# Patient Record
(demographics unavailable — no encounter records)

---

## 2024-10-25 NOTE — SOCIAL HISTORY
[Parent(s)] : parent(s) [___ Brothers] : [unfilled] brothers [Grade:  _____] : Grade: [unfilled] [Cat] : cat [Smokers in Household] : there are no smokers in the home

## 2024-10-25 NOTE — BIRTH HISTORY
[At ___ Weeks Gestation] : at [unfilled] weeks gestation [de-identified] : twin A [FreeTextEntry1] : 6-1 [FreeTextEntry4] : pregnancy complicated by hypertension, incompetent cervix, cholestasis. Cerclage placed.  Mother was on bed rest from 4 months of age.

## 2024-10-25 NOTE — PHYSICAL EXAM
[Well Nourished] : well nourished [Well Developed] : well developed [Alert] : ~L alert [Active] : active [No Allergic Shiners] : no allergic shiners [No Drainage] : no drainage [No Conjunctivitis] : no conjunctivitis [Tympanic Membranes Clear] : tympanic membranes were clear [No Nasal Drainage] : no nasal drainage [No Sinus Tenderness] : no sinus tenderness [No Oral Pallor] : no oral pallor [No Oral Cyanosis] : no oral cyanosis [Non-Erythematous] : non-erythematous [Tonsil Size ___] : tonsil size [unfilled] [No Tonsillar Enlargement] : no tonsillar enlargement [No Stridor] : no stridor [Absence Of Retractions] : absence of retractions [Symmetric] : symmetric [Good Expansion] : good expansion [No Acc Muscle Use] : no accessory muscle use [Normal Sinus Rhythm] : normal sinus rhythm [No Heart Murmur] : no heart murmur [Soft, Non-Tender] : soft, non-tender [No Hepatosplenomegaly] : no hepatosplenomegaly [Non Distended] : was not ~L distended [Abdomen Mass (___ Cm)] : no abdominal mass palpated [Abdomen Hernia] : no hernia was discovered [Full ROM] : full range of motion [No Clubbing] : no clubbing [No Cyanosis] : no cyanosis [No Petechiae] : no petechiae [No Kyphoscoliosis] : no kyphoscoliosis [No Contractures] : no contractures [Abnormal Walk] : normal gait [Alert and  Oriented] : alert and oriented [No Abnormal Focal Findings] : no abnormal focal findings [Normal Muscle Tone And Reflexes] : normal muscle tone and reflexes [No Birth Marks] : no birth marks [No Skin Ulcers] : no skin ulcers [Good aeration to bases] : good aeration to bases [Equal Breath Sounds] : equal breath sounds bilaterally [No Crackles] : no crackles [No Rhonchi] : no rhonchi [No Wheezing] : no wheezing [FreeTextEntry4] : Nasal mucous membranes lyly [de-identified] : Papular rash arms, improved

## 2024-10-25 NOTE — CONSULT LETTER
[Dear  ___] : Dear  [unfilled], [Consult Letter:] : I had the pleasure of evaluating your patient, [unfilled]. [Please see my note below.] : Please see my note below. [Consult Closing:] : Thank you very much for allowing me to participate in the care of this patient.  If you have any questions, please do not hesitate to contact me. [Sincerely,] : Sincerely, [FreeTextEntry3] : Cayla Cedeno MD\par  Pediatric Pulmonology and Sleep Medicine\par  Director Pediatric Ast[facility] Center\par  , Pediatric Sleep Disorders,\par   of Pediatrics, [facility],\par  [address] [place] [address]\[place] [address]\par  (P)[phone]\par  (P) [phone]\par  (F) [phone] \par  \par

## 2024-10-25 NOTE — REVIEW OF SYSTEMS
[Nl] : Endocrine [Rash] : rash [Eczema] : eczema [Allergy Shiners] : allergy shiners [Frequent URIs] : no frequent upper respiratory infections [Snoring] : no snoring [Apnea] : no apnea [Restlessness] : no restlessness [Daytime Sleepiness] : no daytime sleepiness [Daytime Hyperactivity] : no daytime hyperactivity [Voice Changes] : no voice changes [Frequent Croup] : no frequent croup [Chronic Hoarseness] : no chronic hoarseness [Rhinorrhea] : no rhinorrhea [Nasal Congestion] : no nasal congestion [Sinus Problems] : no sinus problems [Postnasl Drip] : no postnasal drip [Epistaxis] : no epistaxis [Tinnitus] : no tinnitus [Recurrent Ear Infections] : no recurrent ear infections [Recurrent Sinus Infections] : no recurrent sinus infections [Tachypnea] : not tachypneic [Wheezing] : no wheezing [Cough] : no cough [Shortness of Breath] : no shortness of breath [Bronchitis] : no bronchitis [Pneumonia] : no pneumonia [Hemoptysis] : no hemoptysis [Sputum] : no sputum [Chronically Infected with ___] : no chronic infections [Spitting Up] : not spitting up [Problems Swallowing] : no problems swallowing [Abdominal Pain] : no abdominal pain [Diarrhea] : no diarrhea [Constipation] : no constipation [Foul Smelling Stool] : no foul smelling stool [Oily Stool] : no oily stool [Reflux] : no reflux [Vomiting] : no vomiting [Food Intolerance] : food tolerant [Abdomen Distention] : abdomen not distended [Rectal Prolapse] : no rectal prolapse [Urgency] : no feelings of urinary urgency [Dysuria] : no dysuria [Fracture] : no fracture [Birth Marks] : no birth marks [Skin Infections] : no skin infections [Urticaria] : no urticaria [Laryngeal Edema] : no laryngeal edema [Immunocompromised] : not immunocompromised [Angioedema] : no angioedema [FreeTextEntry4] : Clearing throat

## 2024-10-25 NOTE — HISTORY OF PRESENT ILLNESS
[Stable] : are stable [FreeTextEntry1] : This 8-year-old returns for a follow-up visit.    He was receiving Advair 115/21, 2 puffs twice daily with a spacer and montelukast routinely. He was not receiving the low-dose theophylline that had been prescribed..   He tolerates activity well if he receives albuterol prior to activity, otherwise he will cough.He was no longer clearing his throat frequently.  He receives fluticasone nasal spray as needed.  He had a sick visit September 2024 with cough and congestion.  Symptoms resolved spontaneously.  He does not drink milk. He had had 2 sick visits in December 2023 with fever and cough.  Antibiotics were prescribed at the time of one of the sick visits.  He  has a H/O coughing once or twice a week.Has a history of coughing at night once or twice a week.  . He is usually not nasally congested.   He receives cetirizine as needed. He had a sick visit for an infection in the periungual area.  Antibiotics were prescribed.  He was congested for a few days in September 2023.   Winter 2022/2023,he had  a sick visit for fever, vomiting and diarrhea.  He had another sick visit for fever.  He developed a severe cough on 1 occasion which resolved within 5 days of using the action plan.  The Atrovent nasal spray I prescribed was not covered.  He had had sick visits for increased cough September and October 2022.  He was wheezing at that time.`       He receives multivitamins.   He had a sick visit for a croupy cough in January 2021.  He received oral steroids.  In August 2021 he developed a croupy cough, fever and a runny nose.   He was off MiraLAX.  He has normal bowel movements.  In the past his 25 hydroxy vitamin D level was within the normal range at 44.93 NG /mL.   He fractured his left leg and had a cast placed.         Sleep: He does not snore at night.  PAST MEDICAL HISTORY: He has had coughing and vomiting from early infancy. History of a persistent cough of over a year's duration.,though he is now much improved..  Speech: He had been receiving speech therapy. This had been discontinued.He received physical therapy and occupational therapy as well. He was in early intervention.  At present he does not receive any services. Allergy testing negative, but IgE elevated..  Hospitalizations: Never Emergency room visits: 3 times in his first year for fever, shortness of breath and cough  Surgery: He has never been operated on.   Respiratory allergy panel by the immunoCAP technique was negative.  IgE was elevated.

## 2024-10-25 NOTE — ASSESSMENT
[FreeTextEntry1] : Impression: Moderate persistent bronchial asthma, allergic rhinitis, atopic dermatitis.  Moderate persistent bronchial asthma:    Results of exhaled nitric oxide testing discussed. Advair was continued 115/21,  2 puffs twice daily and montelukast, 5 mg daily.  Albuterol is to be administered prior to activity and every 4 hours as needed. . Low-dose theophylline was discontinued..Technique of inhaler use with spacer and mouthpiece was reviewed.  He would benefit from receiving the influenza vaccine. Allergic rhinitis: IgE is elevated though testing was otherwise negative. Cetirizine is to be administered as needed..Fluticasone was prescribed, 2 puffs each nostril in the morning prn Atopic dermatitis: Suggested using Vaseline liberally.        Constipation : Appears to have resolved.    Possible vitamin D deficiency: Encouraged mother to increase his milk intake.  His 25 hydroxy vitamin D level is in the normal range.    Will repeat 25-hydroxy vitamin D level if his milk intake does not increase. Snoring:Resolved   Over 50% of time was spent in counseling.   I asked mother to bring Maxwell back for a follow-up visit in 4 months.  Dictation generated through [facility]. Note not proofed and edited.

## 2025-02-28 NOTE — REVIEW OF SYSTEMS
[Nl] : Endocrine [Rash] : rash [Eczema] : eczema [Allergy Shiners] : allergy shiners [Frequent URIs] : no frequent upper respiratory infections [Snoring] : no snoring [Apnea] : no apnea [Restlessness] : no restlessness [Daytime Sleepiness] : no daytime sleepiness [Daytime Hyperactivity] : no daytime hyperactivity [Voice Changes] : no voice changes [Frequent Croup] : no frequent croup [Chronic Hoarseness] : no chronic hoarseness [Rhinorrhea] : no rhinorrhea [Nasal Congestion] : no nasal congestion [Sinus Problems] : no sinus problems [Postnasl Drip] : no postnasal drip [Epistaxis] : no epistaxis [Tinnitus] : no tinnitus [Recurrent Ear Infections] : no recurrent ear infections [Recurrent Sinus Infections] : no recurrent sinus infections [Tachypnea] : not tachypneic [Wheezing] : no wheezing [Cough] : no cough [Shortness of Breath] : no shortness of breath [Bronchitis] : no bronchitis [Pneumonia] : no pneumonia [Hemoptysis] : no hemoptysis [Sputum] : no sputum [Chronically Infected with ___] : no chronic infections [Spitting Up] : not spitting up [Problems Swallowing] : no problems swallowing [Abdominal Pain] : no abdominal pain [Diarrhea] : no diarrhea [Constipation] : no constipation [Foul Smelling Stool] : no foul smelling stool [Oily Stool] : no oily stool [Reflux] : no reflux [Vomiting] : no vomiting [Food Intolerance] : food tolerant [Rectal Prolapse] : no rectal prolapse [Abdomen Distention] : abdomen not distended [Urgency] : no feelings of urinary urgency [Dysuria] : no dysuria [Fracture] : no fracture [Birth Marks] : no birth marks [Skin Infections] : no skin infections [Urticaria] : no urticaria [Laryngeal Edema] : no laryngeal edema [Immunocompromised] : not immunocompromised [Angioedema] : no angioedema [FreeTextEntry4] : Clearing throat

## 2025-02-28 NOTE — CONSULT LETTER
[Dear  ___] : Dear  [unfilled], [Consult Letter:] : I had the pleasure of evaluating your patient, [unfilled]. [Please see my note below.] : Please see my note below. [Consult Closing:] : Thank you very much for allowing me to participate in the care of this patient.  If you have any questions, please do not hesitate to contact me. [Sincerely,] : Sincerely, [FreeTextEntry3] : Cayla Cedeno MD\par  Pediatric Pulmonology and Sleep Medicine\par  Director Pediatric Asthma Center\par  , Pediatric Sleep Disorders,\par   of Pediatrics, [facility] at [facility],\par  [address] [place] [address]\[place] [address]\par  (P)[phone]\par  (P) [phone]\par  (F) [phone] \par  \par

## 2025-02-28 NOTE — ASSESSMENT
[FreeTextEntry1] : Impression: Moderate persistent bronchial asthma, allergic rhinitis, atopic dermatitis.  Moderate persistent bronchial asthma:    Results of exhaled nitric oxide testing and spirometry were discussed. Advair was continued 115/21,  2 puffs twice daily and montelukast, 5 mg daily.  Albuterol is to be administered prior to activity and every 4 hours as needed. .Mother has decided against the influenza vaccine this season. Allergic rhinitis: IgE is elevated though testing was otherwise negative. Cetirizine is to be administered as needed..Fluticasone was prescribed, 2 puffs each nostril in the morning prn Atopic dermatitis: Suggested using Vaseline liberally.        Constipation : Appears to have resolved.    Possible vitamin D deficiency: Encouraged mother to increase his milk intake.  His 25 hydroxy vitamin D level is in the normal range.    Will repeat 25-hydroxy vitamin D level if his milk intake does not increase.He is receiving multivitamins. Snoring:Resolved   Over 50% of time was spent in counseling.   I asked mother to bring Maxwell back for a follow-up visit in 4 months.  Dictation generated through [facility]. Note not proofed and edited.

## 2025-02-28 NOTE — PHYSICAL EXAM
[Well Nourished] : well nourished [Well Developed] : well developed [Alert] : ~L alert [Active] : active [No Allergic Shiners] : no allergic shiners [No Drainage] : no drainage [No Conjunctivitis] : no conjunctivitis [Tympanic Membranes Clear] : tympanic membranes were clear [No Nasal Drainage] : no nasal drainage [No Sinus Tenderness] : no sinus tenderness [No Oral Pallor] : no oral pallor [No Oral Cyanosis] : no oral cyanosis [Non-Erythematous] : non-erythematous [Tonsil Size ___] : tonsil size [unfilled] [No Tonsillar Enlargement] : no tonsillar enlargement [No Stridor] : no stridor [Absence Of Retractions] : absence of retractions [Symmetric] : symmetric [Good Expansion] : good expansion [No Acc Muscle Use] : no accessory muscle use [Good aeration to bases] : good aeration to bases [Equal Breath Sounds] : equal breath sounds bilaterally [No Crackles] : no crackles [No Rhonchi] : no rhonchi [No Wheezing] : no wheezing [Normal Sinus Rhythm] : normal sinus rhythm [No Heart Murmur] : no heart murmur [Soft, Non-Tender] : soft, non-tender [No Hepatosplenomegaly] : no hepatosplenomegaly [Non Distended] : was not ~L distended [Abdomen Mass (___ Cm)] : no abdominal mass palpated [Abdomen Hernia] : no hernia was discovered [Full ROM] : full range of motion [No Clubbing] : no clubbing [No Cyanosis] : no cyanosis [No Petechiae] : no petechiae [No Kyphoscoliosis] : no kyphoscoliosis [No Contractures] : no contractures [Abnormal Walk] : normal gait [Alert and  Oriented] : alert and oriented [No Abnormal Focal Findings] : no abnormal focal findings [Normal Muscle Tone And Reflexes] : normal muscle tone and reflexes [No Birth Marks] : no birth marks [No Skin Ulcers] : no skin ulcers [FreeTextEntry4] : Nasal mucous membranes lyly [de-identified] : Papular rash arms, improved

## 2025-02-28 NOTE — BIRTH HISTORY
[At ___ Weeks Gestation] : at [unfilled] weeks gestation [de-identified] : twin A [FreeTextEntry1] : 6-1 [FreeTextEntry4] : pregnancy complicated by hypertension, incompetent cervix, cholestasis. Cerclage placed.  Mother was on bed rest from 4 months of age.

## 2025-02-28 NOTE — IMPRESSION
[Spirometry] : Spirometry [FreeTextEntry1] :  Exhaled nitric oxide 17 Spirometry normal with an FEV1 by FVC of 98% and FEF 25 to 75% of 93% predicted.

## 2025-02-28 NOTE — HISTORY OF PRESENT ILLNESS
[Stable] : are stable [FreeTextEntry1] : This 9-year-old returns for a follow-up visit.    He was receiving Advair 115/21, 2 puffs twice daily with a spacer and montelukast routinely.    He tolerates activity well if he receives albuterol prior to activity, otherwise he will cough.He was no longer clearing his throat frequently.  He receives fluticasone nasal spray as needed. He had not had any sick visits since last seen.  He had a sick visit September 2024 with cough and congestion.  Symptoms resolved spontaneously. He drinks a cup of milk a day but receives multivitamins. He had had 2 sick visits in December 2023 with fever and cough.  Antibiotics were prescribed at the time of one of the sick visits.  He  has a H/O coughing once or twice a week.No longer coughing at night. He is usually not nasally congested.   He receives cetirizine as needed. He had a sick visit for an infection in the periungual area.  Antibiotics were prescribed.  He was congested for a few days in September 2023.   Winter 2022/2023,he had  a sick visit for fever, vomiting and diarrhea.  He had another sick visit for fever.  He developed a severe cough on 1 occasion which resolved within 5 days of using the action plan.  The Atrovent nasal spray I prescribed was not covered.  He had had sick visits for increased cough September and October 2022.  He was wheezing at that time.`       He receives multivitamins.   He had a sick visit for a croupy cough in January 2021.  He received oral steroids.  In August 2021 he developed a croupy cough, fever and a runny nose.   He was off MiraLAX.  He has normal bowel movements.  In the past his 25 hydroxy vitamin D level was within the normal range at 44.93 NG /mL.   He fractured his left leg and had a cast placed.         Sleep: He does not snore at night.  PAST MEDICAL HISTORY: He has had coughing and vomiting from early infancy. History of a persistent cough of over a year's duration.,though he is now much improved..  Speech: He had been receiving speech therapy. This had been discontinued.He received physical therapy and occupational therapy as well. He was in early intervention.  At present he does not receive any services. Allergy testing negative, but IgE elevated..  Hospitalizations: Never Emergency room visits: 3 times in his first year for fever, shortness of breath and cough  Surgery: He has never been operated on.   Respiratory allergy panel by the immunoCAP technique was negative.  IgE was elevated.

## 2025-06-27 NOTE — PHYSICAL EXAM
[Well Nourished] : well nourished [Well Developed] : well developed [Alert] : ~L alert [Active] : active [No Allergic Shiners] : no allergic shiners [No Drainage] : no drainage [No Conjunctivitis] : no conjunctivitis [Tympanic Membranes Clear] : tympanic membranes were clear [No Nasal Drainage] : no nasal drainage [No Sinus Tenderness] : no sinus tenderness [No Oral Pallor] : no oral pallor [No Oral Cyanosis] : no oral cyanosis [Non-Erythematous] : non-erythematous [Tonsil Size ___] : tonsil size [unfilled] [No Tonsillar Enlargement] : no tonsillar enlargement [No Stridor] : no stridor [Absence Of Retractions] : absence of retractions [Symmetric] : symmetric [Good Expansion] : good expansion [No Acc Muscle Use] : no accessory muscle use [Good aeration to bases] : good aeration to bases [Equal Breath Sounds] : equal breath sounds bilaterally [No Crackles] : no crackles [No Rhonchi] : no rhonchi [No Wheezing] : no wheezing [Normal Sinus Rhythm] : normal sinus rhythm [No Heart Murmur] : no heart murmur [Soft, Non-Tender] : soft, non-tender [No Hepatosplenomegaly] : no hepatosplenomegaly [Non Distended] : was not ~L distended [Abdomen Mass (___ Cm)] : no abdominal mass palpated [Abdomen Hernia] : no hernia was discovered [Full ROM] : full range of motion [No Clubbing] : no clubbing [No Cyanosis] : no cyanosis [No Petechiae] : no petechiae [No Kyphoscoliosis] : no kyphoscoliosis [No Contractures] : no contractures [Abnormal Walk] : normal gait [Alert and  Oriented] : alert and oriented [No Abnormal Focal Findings] : no abnormal focal findings [Normal Muscle Tone And Reflexes] : normal muscle tone and reflexes [No Birth Marks] : no birth marks [No Skin Ulcers] : no skin ulcers [FreeTextEntry4] : Nasal mucous membranes lyly [de-identified] : Papular rash arms, improved

## 2025-06-27 NOTE — BIRTH HISTORY
[At ___ Weeks Gestation] : at [unfilled] weeks gestation [de-identified] : twin A [FreeTextEntry1] : 6-1 [FreeTextEntry4] : pregnancy complicated by hypertension, incompetent cervix, cholestasis. Cerclage placed.  Mother was on bed rest from 4 months of age.

## 2025-06-27 NOTE — HISTORY OF PRESENT ILLNESS
[Stable] : are stable [FreeTextEntry1] : This 9-year-old returns for a follow-up visit.    He was receiving Advair 115/21, 2 puffs twice daily with a spacer and montelukast routinely.    He tolerates activity well if he receives albuterol prior to activity, otherwise he will cough.He was no longer clearing his throat frequently.  He receives fluticasone nasal spray as needed. He had not had any sick visits since last seen. He developed a cough on 1 occasion which resolved within a day.  He had a sick visit September 2024 with cough and congestion.  Symptoms resolved spontaneously. He is now drinking 2 cups of milk a day. He had had 2 sick visits in December 2023 with fever and cough.  Antibiotics were prescribed at the time of one of the sick visits.  He  has a H/O coughing once or twice a week.No longer coughing at night. He is usually not nasally congested.   He receives cetirizine as needed. He had a sick visit for an infection in the periungual area.  Antibiotics were prescribed.  He was congested for a few days in September 2023.   Winter 2022/2023,he had  a sick visit for fever, vomiting and diarrhea.  He had another sick visit for fever.  He developed a severe cough on 1 occasion which resolved within 5 days of using the action plan.  The Atrovent nasal spray I prescribed was not covered.  He had had sick visits for increased cough September and October 2022.  He was wheezing at that time.` He is doing very well academically.      He receives multivitamins.   He had a sick visit for a croupy cough in January 2021.  He received oral steroids.  In August 2021 he developed a croupy cough, fever and a runny nose.   He was off MiraLAX.  He has normal bowel movements.  In the past his 25 hydroxy vitamin D level was within the normal range at 44.93 NG /mL.   He fractured his left leg and had a cast placed.         Sleep: He does not snore at night.  PAST MEDICAL HISTORY: He has had coughing and vomiting from early infancy. History of a persistent cough of over a year's duration.,though he is now much improved..  Speech: He had been receiving speech therapy. This had been discontinued.He received physical therapy and occupational therapy as well. He was in early intervention.  At present he does not receive any services. Allergy testing negative, but IgE elevated..  Hospitalizations: Never Emergency room visits: 3 times in his first year for fever, shortness of breath and cough  Surgery: He has never been operated on.   Respiratory allergy panel by the immunoCAP technique was negative.  IgE was elevated.

## 2025-06-27 NOTE — SOCIAL HISTORY
[Parent(s)] : parent(s) [___ Brothers] : [unfilled] brothers [Cat] : cat [Grade:  _____] : Grade: [unfilled] [Smokers in Household] : there are no smokers in the home

## 2025-06-27 NOTE — CONSULT LETTER
[Dear  ___] : Dear  [unfilled], [Consult Letter:] : I had the pleasure of evaluating your patient, [unfilled]. [Please see my note below.] : Please see my note below. [Consult Closing:] : Thank you very much for allowing me to participate in the care of this patient.  If you have any questions, please do not hesitate to contact me. [Sincerely,] : Sincerely, [FreeTextEntry3] : Cayla Cedeno MD\par  Pediatric Pulmonology and Sleep Medicine\par  Director Pediatric Asthma Center\par  , Pediatric Sleep Disorders,\par   of Pediatrics, [facility] at [facility],\par  [address] [place] [address]\[place] [address]\par  (P)[phone]\par  (P) [phone]\par  (F) [phone] \par  \par

## 2025-06-27 NOTE — ASSESSMENT
[FreeTextEntry1] : Impression: Moderate persistent bronchial asthma, allergic rhinitis, atopic dermatitis.  Moderate persistent bronchial asthma:    Results of exhaled nitric oxide testing discussed. Advair was continued 115/21,  2 puffs twice daily and montelukast, 5 mg daily.  Albuterol is to be administered prior to activity and every 4 hours as needed. Medication administration form is being filled out for the coming school year. Allergic rhinitis: IgE is elevated though testing was otherwise negative. Cetirizine is to be administered as needed..Fluticasone was prescribed, 2 puffs each nostril in the morning prn Atopic dermatitis: Suggested using Vaseline liberally.        Constipation : Appears to have resolved.    Possible vitamin D deficiency: His intake of milk is increased. His 25 hydroxy vitamin D level is in the normal range.     Snoring:Resolved   Over 50% of time was spent in counseling.   I asked mother to bring Maxwell back for a follow-up visit in 4 months.  Dictation generated through [facility]. Note not proofed and edited.